# Patient Record
Sex: FEMALE | Race: WHITE
[De-identification: names, ages, dates, MRNs, and addresses within clinical notes are randomized per-mention and may not be internally consistent; named-entity substitution may affect disease eponyms.]

---

## 2017-03-20 ENCOUNTER — HOSPITAL ENCOUNTER (OUTPATIENT)
Dept: HOSPITAL 39 - GMAJ | Age: 67
Discharge: HOME | End: 2017-03-20
Attending: FAMILY MEDICINE
Payer: MEDICARE

## 2017-03-20 DIAGNOSIS — E78.1: Primary | ICD-10-CM

## 2017-03-20 DIAGNOSIS — I10: ICD-10-CM

## 2017-03-28 ENCOUNTER — HOSPITAL ENCOUNTER (OUTPATIENT)
Dept: HOSPITAL 39 - MRI | Age: 67
Discharge: HOME | End: 2017-03-28
Attending: FAMILY MEDICINE
Payer: MEDICARE

## 2017-03-28 DIAGNOSIS — M51.27: Primary | ICD-10-CM

## 2017-03-28 NOTE — MRI
EXAM DESCRIPTION: 



Lumbar Spine w/o Contrast



CLINICAL HISTORY: 



LOW BACK PAIN. Lumbar disc disease. Prior lumbar surgeries.

History of polio.



COMPARISON: 



MRI lumbar spine 10/16/2014



TECHNIQUE: 



MRI of the lumbar spine is performed according to our usual

protocol with axial and sagittal multi sequence imaging.



FINDINGS: 



The designated L5-S1 disc space is on axial T2 image 3.

Moderate to severe levoconvex curvature of the mid lumbar spine

is again demonstrated. Vertebral body statures maintained. Modic

type II endplate changes at L1-L2, L3-L4, and L4-L5.

There is no acute fracture or destructive osseous lesion. 

The conus medullaris terminates normally.



L1-2: Disc desiccation with moderate disc narrowing. Moderate to

severe facet hypertrophy. No evidence of spinal canal or neural

foraminal stenosis.



L2-3: Disc desiccation. Mild disc narrowing to the right of

midline. Severe facet hypertrophy with ligamentum flavum

thickening. No evidence of spinal canal or neural foraminal

stenosis.



L3-4: Disc desiccation. Severe facet hypertrophy. No evidence of

spinal canal or neural foraminal stenosis.



L4-5: Disc desiccation. Severe facet hypertrophy with suspected

fusion in the posterior elements. 4 mm leftward eccentric

posterior disc osteophyte complex with multifactorial moderate

spinal canal stenosis with residual AP diameter of the thecal sac

measuring 6.5 mm. Severe left greater than right neural foraminal

stenosis.



L5-S1: Severe facet hypertrophy. Suspected fusion in the

posterior elements. Mild posterior disc bulge and osteophytic

ridging of the endplates. Moderate to severe bilateral neural

foraminal stenosis mainly on the basis of facet hypertrophy. The

spinal canal is patent.



IMPRESSION: 



1. Moderate to severe levoconvex scoliosis in the lumbar spine is

again demonstrated.

2. At L4-L5, moderate spinal canal stenosis and severe left

greater than right neural foraminal stenosis are again

demonstrated, and are not significantly changed from the prior

exam.

3. At L5-S1, there is moderate to severe bilateral neural

foraminal stenosis. 

4. Other findings as above.



Electronically signed by:  Tanner Escobar MD  3/28/2017 1:39 PM CDT

## 2018-03-05 ENCOUNTER — HOSPITAL ENCOUNTER (OUTPATIENT)
Dept: HOSPITAL 39 - GMAJ | Age: 68
End: 2018-03-05
Attending: FAMILY MEDICINE
Payer: MEDICARE

## 2018-03-05 DIAGNOSIS — I10: Primary | ICD-10-CM

## 2018-09-17 ENCOUNTER — HOSPITAL ENCOUNTER (OUTPATIENT)
Dept: HOSPITAL 39 - GMAJ | Age: 68
End: 2018-09-17
Attending: FAMILY MEDICINE
Payer: MEDICARE

## 2018-09-17 DIAGNOSIS — E55.9: ICD-10-CM

## 2018-09-17 DIAGNOSIS — E53.8: Primary | ICD-10-CM

## 2018-09-20 ENCOUNTER — HOSPITAL ENCOUNTER (OUTPATIENT)
Dept: HOSPITAL 39 - CT | Age: 68
End: 2018-09-20
Attending: FAMILY MEDICINE
Payer: MEDICARE

## 2018-09-20 DIAGNOSIS — K44.9: ICD-10-CM

## 2018-09-20 DIAGNOSIS — R05: Primary | ICD-10-CM

## 2018-09-20 NOTE — CT
EXAM DESCRIPTION: 

Chest w/Contrast : Computed Tomography.



CLINICAL HISTORY: 

COUGH



COMPARISON: 

Portable chest x-ray 8/24/2017.



TECHNIQUE: 

Spiral-axial scans at 5.0 mm intervals through the lungs and

thorax with IV contrast. 2.5 mm lung algorithm axial

reconstructions. Coronal sagittal 2.0 Mm reconstructions. No

adverse reactions.  Total Exam DLP: 794.14 mGy-cm. This exam was

performed according to our departmental dose-optimization program

which includes automated exposure control, adjustment of the mA

and/or kV according to patient size and/or use of iterative

reconstruction technique; to reduce radiation dose to as low as

reasonably achievable (ALARA).



FINDINGS: 

Tiny small blebs in the parenchyma more prevalent in the

bilateral upper lobes compared to the bilateral lower lung

fields. No abnormal nodules masses or infiltrates bilaterally.

Atelectasis and volume loss in the left lower lobe due to large

medial posterior left chelly- diaphragmatic hernia containing the

stomach. No pleural effusion or pneumothorax bilaterally.



Thyroid gland enhances well in the base of the neck. No

abnormally sized lymph nodes or soft tissue masses in the

mediastinum, hilum, axilla or neck base. Asymmetric density in

the lower outer quadrant of the right breast. Asymmetric fatty

tissue with edema or increased fatty density abutting the left

lower lung base and extends to the lateral left abdomen

subcutaneous tissue. No definite mass..



Upper spleen is partially within the hernia described above.

Included peritoneal space shows no free air or fluid. Surgical

clips in the gallbladder fossa with no fluid. Minimal dilation of

the common bile duct. Included pancreas is negative.



Thoracic spondylosis. Dextroscoliosis of the mid and upper

thoracic spine. Included lumbar spine with levoscoliosis and

spondylosis from T1 to L2.



IMPRESSION: 

1. Minimal centrilobular type emphysematous changes with no

abnormal nodule mass or infiltrate.

2. Large posterior medial hernia in the left hemidiaphragm

containing the fundus and upper body of the stomach in the upper

spleen along with mesenteric fat. No gastric volvulus. Mass

effect with atelectasis on the left lower lobe. Consider surgical

evaluation if this hernia is compromising pulmonary or

gastrointestinal function.

3. Prominence and edema of subcutaneous tissues lateral to the

left thoracoabdominal wall extending inferiorly. No definite mass

or fluid collection.

4. Asymmetric density in the lower outer quadrant of the right

breast. PACS shows most recent mammogram at this facility was

November 2015. Consider bilateral diagnostic digital breast

tomosynthesis mammography.



Electronically signed by:  Juancarlos Chavez MD  9/20/2018 4:38 PM CDT

Workstation: [id number]

## 2018-10-11 ENCOUNTER — HOSPITAL ENCOUNTER (OUTPATIENT)
Dept: HOSPITAL 39 - RAD | Age: 68
End: 2018-10-11
Attending: FAMILY MEDICINE
Payer: MEDICARE

## 2018-10-11 DIAGNOSIS — K44.9: Primary | ICD-10-CM

## 2018-10-11 NOTE — RAD
EXAM DESCRIPTION: 

Barium Swallow: Rad-Fluoroscopy.



CLINICAL HISTORY: 

DIAPHRAGMATIC HERNIA WITHOUT OBSTRUCTION. Patient with polio at

infancy. Paraplegic. Sensation of food being stuck in the distal

esophagus. Reflux. Occasional abdominal pain.



COMPARISON: 

None.



TECHNIQUE:

The patient swallowed barium pill with water.   The patient

swallowed gas-producing granules, and water, prior to beginning

on the fluoroscopic table. Patient then drank heavy density

barium through a straw under fluoroscopic visualization in the

semiprone position. The images were obtained with the patient

prone supine and decubitus.  37 fluoroscopic cine loop images. 18

static fluoroscopic images.  Total fluoroscopy time was 3.5

minutes. DAP:  not recorded. 



FINDINGS: 

Patient has delayed primary peristaltic wave distally. No gómez

pharyngeal aspiration. Distal esophagus angled almost 90 degrees

posteriorly before entering the gastroesophageal junction. The

fundus and the proximal body of the stomach, with

para-esophageal hernia above the left chelly-diaphragm in the

posterior direction. This is best demonstrated on cine series #1,

image 2. Thickened mucosal folds in the fundus. When changing

positions on the table, marked gastroesophageal reflux was noted

almost at the level of the larynx. Breast demonstrated on RF

series 7, image 17. Remainder of the stomach well distended with

gas and contrast material with no thickened folds in the distal

body and antrum or pylorus. Duodenal bulb well distended. No

mucosal duodenal lesions or mass effect. 



IMPRESSION: 

1. Marked paraesophageal hernia involving the fundus and the

proximal body of the stomach in the medial posterior left

hemidiaphragm. Distal esophagus angulated almost 90 degrees.

Marked gastroesophageal reflux almost to the larynx.

2. Thickened folds in the fundus and body of the stomach could

represent gastritis. No other intrinsic lesions or mass effect.

No abnormal is unremarkable.



Electronically signed by:  Juancarlos Chavez MD  10/11/2018 5:09 PM

CDT Workstation: [id number]

## 2018-12-15 ENCOUNTER — HOSPITAL ENCOUNTER (EMERGENCY)
Dept: HOSPITAL 39 - ER | Age: 68
Discharge: HOME | End: 2018-12-15
Payer: MEDICARE

## 2018-12-15 VITALS — SYSTOLIC BLOOD PRESSURE: 145 MMHG | OXYGEN SATURATION: 98 % | DIASTOLIC BLOOD PRESSURE: 84 MMHG

## 2018-12-15 VITALS — TEMPERATURE: 97.4 F

## 2018-12-15 DIAGNOSIS — Z87.891: ICD-10-CM
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
## 2018-12-15 DIAGNOSIS — Z79.899: ICD-10-CM

## 2018-12-15 DIAGNOSIS — Z86.12: ICD-10-CM
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
## 2018-12-15 DIAGNOSIS — Z88.8: ICD-10-CM
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
## 2018-12-15 DIAGNOSIS — S82.55XA: Primary | ICD-10-CM

## 2018-12-15 DIAGNOSIS — K21.9: ICD-10-CM

## 2018-12-15 DIAGNOSIS — Z99.3: ICD-10-CM
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
## 2018-12-21 ENCOUNTER — HOSPITAL ENCOUNTER (OUTPATIENT)
Dept: HOSPITAL 39 - RAD | Age: 68
End: 2018-12-21
Attending: ORTHOPAEDIC SURGERY
Payer: MEDICARE

## 2018-12-21 DIAGNOSIS — S82.54XA: Primary | ICD-10-CM

## 2019-01-07 ENCOUNTER — HOSPITAL ENCOUNTER (OUTPATIENT)
Dept: HOSPITAL 39 - RAD | Age: 69
End: 2019-01-07
Attending: ORTHOPAEDIC SURGERY
Payer: MEDICARE

## 2019-01-07 DIAGNOSIS — S82.892A: Primary | ICD-10-CM

## 2019-01-07 NOTE — RAD
EXAM DESCRIPTION: 



Ankle,Left 3 Views



CLINICAL HISTORY: 



ANKLE PAIN



COMPARISON: 



21 December 2018



TECHNIQUE: 



3 views left



FINDINGS: 



The exam is obtained through casting material. A fracture of the

medial malleolus is observed. The injury is essentially

nondisplaced. The ankle mortise is intact. No callus formation is

visualized through the cast



IMPRESSION: 



Left medial malleolar fracture



Electronically signed by:  Clay Swenson MD  1/7/2019 9:41 AM

[facility] Workstation: [id number]

## 2019-01-18 ENCOUNTER — HOSPITAL ENCOUNTER (OUTPATIENT)
Dept: HOSPITAL 39 - RAD | Age: 69
End: 2019-01-18
Attending: ORTHOPAEDIC SURGERY
Payer: MEDICARE

## 2019-01-18 DIAGNOSIS — S82.55XD: Primary | ICD-10-CM

## 2019-02-11 ENCOUNTER — HOSPITAL ENCOUNTER (OUTPATIENT)
Dept: HOSPITAL 39 - RAD | Age: 69
End: 2019-02-11
Attending: ORTHOPAEDIC SURGERY
Payer: MEDICARE

## 2019-02-11 DIAGNOSIS — S82.55XD: Primary | ICD-10-CM
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
## 2019-02-22 ENCOUNTER — HOSPITAL ENCOUNTER (OUTPATIENT)
Dept: HOSPITAL 39 - GMAJ | Age: 69
End: 2019-02-22
Attending: FAMILY MEDICINE
Payer: MEDICARE

## 2019-02-22 DIAGNOSIS — E55.9: ICD-10-CM

## 2019-02-22 DIAGNOSIS — E53.8: Primary | ICD-10-CM

## 2019-06-18 ENCOUNTER — HOSPITAL ENCOUNTER (OUTPATIENT)
Dept: HOSPITAL 39 - LAB.O | Age: 69
End: 2019-06-18
Attending: ORTHOPAEDIC SURGERY
Payer: MEDICARE

## 2019-06-18 DIAGNOSIS — Z01.818: Primary | ICD-10-CM

## 2019-06-24 NOTE — HP
CHIEF COMPLAINT:  Left hand numbness.



HISTORY OF PRESENT ILLNESS:  Martin is a 68-year-old female that we had talked 
about having carpal tunnel release in the past.  She has had numbness going on 
in her hand for several years.  She has had no new trauma and states that it is 
now numb all the time.  It does worsen with certain activities.  Because of the 
presence of it and the lack of response to conservative measures, she has 
requested operative intervention.  After discussing the risks, benefits and 
alternatives to that, she has given informed consent.  



PAST SURGICAL HISTORY:  

1.  Spinal fusion.

2.  .

3.  Cholecystectomy.

4.  Hysterectomy.

5.  Bilateral rotator cuff repair.

6.  Cystectomy.

7.  Carpal tunnel release.

8.  Hiatal hernia repair.

9.  Ulnar nerve transposition.



MEDICATIONS:  

1.  Bisoprolol.

2.  Gabapentin.

3.  Dexilant.

4.  Clonazepam.

5.  Duloxetine.

6.  Cyclobenzaprine.

7.  Aleve.

8.  Fluticasone.



ALLERGIES:  DEMEROL, MORPHINE, REGLAN.



CODE STATUS:  Full code.



IMMUNIZATIONS:  Up to date.



SOCIAL HISTORY:  The patient does not smoke or use any illicit drugs.  She does 
drink on occasion.  



FAMILY HISTORY:  None pertinent to today's complaint.



REVIEW OF SYSTEMS:  Negative except as indicated in the History of Present 
Illness.



PHYSICAL EXAMINATION:



VITAL SIGNS:  Blood pressure 129/73.  Pulse 81.  Height 4'10".  Weight 155 
pounds.



MENTAL STATUS:  The patient is awake, alert, and is able to give a good history 
and participate in the physical.  The patient is oriented to person, place and 
time.



SKIN:  Normal tone and turgor.



MUSCULOSKELETAL:  She has positive carpal compression test and significant 
thenar atrophy.  She has paresthesias at rest in the distribution of the median 
nerve.  She has a warm and well perfused extremity.  She has no deformities in 
the hand and shows full range of motion in the shoulder, elbow, wrist and 
digits.



ASSESSMENT:

1.  Carpal tunnel syndrome.



PLAN: The plan at this point is for carpal tunnel release.  Martin and I have 
discussed in the past the risks, benefits and alternatives to this and we have 
also discussed the potential for limited recovery just given her presence of 
symptoms now on a continual basis.  After considering the risks, benefits and 
alternatives to this, she has given informed consent.



#[id number]

[facility]

## 2019-06-25 ENCOUNTER — HOSPITAL ENCOUNTER (OUTPATIENT)
Dept: HOSPITAL 39 - AMB | Age: 69
Discharge: HOME | End: 2019-06-25
Attending: ORTHOPAEDIC SURGERY
Payer: MEDICARE

## 2019-06-25 VITALS — TEMPERATURE: 98.1 F | DIASTOLIC BLOOD PRESSURE: 75 MMHG | OXYGEN SATURATION: 98 % | SYSTOLIC BLOOD PRESSURE: 115 MMHG

## 2019-06-25 DIAGNOSIS — Z90.710: ICD-10-CM

## 2019-06-25 DIAGNOSIS — Z88.5: ICD-10-CM

## 2019-06-25 DIAGNOSIS — Z98.1: ICD-10-CM

## 2019-06-25 DIAGNOSIS — K21.9: ICD-10-CM

## 2019-06-25 DIAGNOSIS — G56.02: Primary | ICD-10-CM

## 2019-06-25 DIAGNOSIS — D50.9: ICD-10-CM

## 2019-06-25 DIAGNOSIS — Z88.8: ICD-10-CM

## 2019-06-25 DIAGNOSIS — G14: ICD-10-CM

## 2019-06-25 DIAGNOSIS — M51.36: ICD-10-CM

## 2019-06-25 DIAGNOSIS — Z79.899: ICD-10-CM

## 2019-06-25 DIAGNOSIS — I10: ICD-10-CM

## 2019-06-25 DIAGNOSIS — G25.81: ICD-10-CM

## 2019-06-25 PROCEDURE — 01810 ANES PX NRV MUSC F/ARM WRST: CPT

## 2019-06-25 PROCEDURE — 80307 DRUG TEST PRSMV CHEM ANLYZR: CPT

## 2019-06-25 PROCEDURE — 64721 CARPAL TUNNEL SURGERY: CPT

## 2019-06-25 RX ADMIN — VANCOMYCIN HYDROCHLORIDE ONE MG: 500 INJECTION, POWDER, LYOPHILIZED, FOR SOLUTION INTRAVENOUS at 12:06

## 2019-06-25 RX ADMIN — VANCOMYCIN HYDROCHLORIDE ONE MG: 500 INJECTION, POWDER, LYOPHILIZED, FOR SOLUTION INTRAVENOUS at 12:10

## 2019-06-28 NOTE — OP
DATE OF PROCEDURE:  06/25/19 



PREOPERATIVE DIAGNOSIS:

1.  Carpal tunnel syndrome.



POSTOPERATIVE DIAGNOSIS:

1.  Carpal tunnel syndrome.



PROCEDURE:

1.  Carpal tunnel release.



SURGEON:  Husam Banda MD.



ASSISTANT:  Juancarlos Bates CST, SA-C.



ANESTHESIA:  Local with sedation.



COMPLICATIONS:  None.



FINDINGS:  

1.  Advanced thenar atrophy.

2.  Narrowing of the median nerve across the carpal tunnel.



INDICATION:  Ms. Morales has a long history of carpal tunnel syndrome.  She has 
delayed undergoing carpal tunnel release for quite some time.  She finally 
decided to pursue that.  After discussing the risks, benefits and alternatives 
to that, the patient has given informed consent for carpal tunnel release.



PROCEDURE: The patient was brought to the Operating Room and placed in the 
supine position.  Sedation was administered and local anesthetic was injected 
into the operative area under sterile conditions.  After the injection of 
anesthetic, the arm was sterilely prepped and draped.  A longitudinal incision 
was made directly overlying the transverse carpal ligament and blunt dissection 
was carried down to the ligament.  The transverse carpal ligament was sharply 
transected along its length and a Kirkland elevator was used to ensure complete 
release of the ligament.  Once release had been confirmed, the wound was 
thoroughly irrigated and the wound was closed with Nylon suture.  A sterile 
dressing was placed and the patient was taken to the Day Surgery Unit.  



POSTOPERATIVE PLAN:  The patient has been encouraged to do range of motion of 
the digits and will followup with us in two days.  



#[id number]

[facility]

## 2019-10-21 ENCOUNTER — HOSPITAL ENCOUNTER (EMERGENCY)
Dept: HOSPITAL 39 - ER | Age: 69
Discharge: HOME | End: 2019-10-21
Payer: MEDICARE

## 2019-10-21 VITALS — OXYGEN SATURATION: 96 % | DIASTOLIC BLOOD PRESSURE: 81 MMHG | SYSTOLIC BLOOD PRESSURE: 138 MMHG | TEMPERATURE: 98.1 F

## 2019-10-21 DIAGNOSIS — D72.829: ICD-10-CM

## 2019-10-21 DIAGNOSIS — M25.452: ICD-10-CM

## 2019-10-21 DIAGNOSIS — Z90.49: ICD-10-CM

## 2019-10-21 DIAGNOSIS — G89.29: ICD-10-CM

## 2019-10-21 DIAGNOSIS — Z79.899: ICD-10-CM

## 2019-10-21 DIAGNOSIS — M54.9: ICD-10-CM

## 2019-10-21 DIAGNOSIS — K57.32: Primary | ICD-10-CM

## 2019-10-21 DIAGNOSIS — K21.9: ICD-10-CM

## 2019-10-21 DIAGNOSIS — Z86.12: ICD-10-CM

## 2019-10-21 DIAGNOSIS — I10: ICD-10-CM

## 2019-10-21 PROCEDURE — 82553 CREATINE MB FRACTION: CPT

## 2019-10-21 PROCEDURE — 85025 COMPLETE CBC W/AUTO DIFF WBC: CPT

## 2019-10-21 PROCEDURE — 80053 COMPREHEN METABOLIC PANEL: CPT

## 2019-10-21 PROCEDURE — 84484 ASSAY OF TROPONIN QUANT: CPT

## 2019-10-21 PROCEDURE — 82550 ASSAY OF CK (CPK): CPT

## 2019-10-21 PROCEDURE — 87040 BLOOD CULTURE FOR BACTERIA: CPT

## 2019-10-21 PROCEDURE — 93005 ELECTROCARDIOGRAM TRACING: CPT

## 2019-10-21 PROCEDURE — 74177 CT ABD & PELVIS W/CONTRAST: CPT

## 2019-10-21 PROCEDURE — 85610 PROTHROMBIN TIME: CPT

## 2019-10-21 PROCEDURE — 83605 ASSAY OF LACTIC ACID: CPT

## 2019-10-21 PROCEDURE — 71045 X-RAY EXAM CHEST 1 VIEW: CPT

## 2019-10-21 PROCEDURE — 86140 C-REACTIVE PROTEIN: CPT

## 2019-10-21 NOTE — CT
EXAM DESCRIPTION: 

CT Abdomen and Pelvis With Contrast:



CLINICAL HISTORY: 

abd pain.



COMPARISON: 

CT abdomen pelvis without contrast May 9, 2013.



TECHNIQUE: 

Contiguous axial sections are obtained through the abdomen and

pelvis as per protocol after administration of iodinated

contrast. Oral contrast was not administered. . Sagittal and

coronal reformations were obtained.

Automatic exposure control (AEC), mA and/or kV adjustment by

patient size, and/or iterative  reconstructive technique was

used, per departmental dose optimization program, during the

performance of the CT examination.





FINDINGS: 

The  view demonstrates no abnormalities .



The visualized lung bases demonstrates small esophageal hernia. 

The liver is normal  in size and demonstrates normal attenuation

and enhancement. [The spleen, pancreas, adrenal glands appear

normal in size and attenuation without any focal abnormalities.

The gallbladder is surgically absent .



Kidneys demonstrate no evidence of calculi. Kidneys are normal in

size, shape, attenuation and enhancement. Focal parenchymal loss

and thinning is noted at the upper pole of the left kidney. 

The aorta, IVC and retroperitoneal structures appear normal  .

The stomach demonstrates No abnormalities. .

The small bowel loops appear unremarkable. The appendix is not

visualized with certainty. No inflammatory changes are seen in

the region of the base of the cecum, however.  The colon

demonstrates evidence of diverticulosis. Changes suggestive of

diverticulitis are noted involving the rectosigmoid colon with

colonic wall thickening and pericolonic stranding. No evidence of

extraluminal air or fluid collection is noted.

 No evidence of free intraperitoneal fluid or air is noted. 



CT examination of the pelvis demonstrates no evidence of mass or

adenopathy. The urinary bladder appears normal. 

The [Reproductive organs are absent surgically. 

Inguinal regions are unremarkable. 



Marked scoliosis of the thoracolumbar spine is noted. Evidence of

degenerative changes are seen in the facet joints. Evidence of

deformity and flattening of the left femoral head is noted with

large left hip joint effusion. Subchondral cyst formation is

noted in the left femoral head. Remodeling of the left acetabulum

is noted. Presence of a small right hip joint effusion and

capsular thickening is noted. Fatty replacement of the

paraspinous and gluteal muscles are noted greater on the left

than the right.





IMPRESSION: 

Uncomplicated acute diverticulitis of the distal

sigmoid/rectosigmoid colon. Presence of a large left hip joint

effusion and degenerative changes. Postcholecystectomy. Mild

scoliosis and degenerative changes involving the lumbar spine. 



Electronically signed by:  Maria R Crowder MD  10/21/2019 6:29 PM

[facility] Workstation: [id number]

## 2019-10-21 NOTE — ED.PDOC
History of Present Illness





- General


Chief Complaint: Abdominal Pain


Time Seen by Provider: 10/21/19 16:31


Information Source: patient





- History of Present Illness


Initial Comments: 





68 y/o F with hx of polio myelitis presents to the ED c/o worsening abd and back

pain over the last 2 days. She has a hx of chronic back and abd pain but sx have

been worse over the last few weeks and she saw her PCP last week and was treated

for constipation. She has since been having BMs but pain has persisted. She 

takes home pain medications only when needed and took it last night without 

improvement in sx. She has a second fundiplication done in 2019 by Dr. Marcelino 

at [facility] in [facility] and had been good up until August with her stomach. Since 

then it seems that she has gone down hill. She states that she has been trying 

to eat but sx are significantly worse after eating. Pain now is 10/10 in 

severity and nothing she does seems to make it significantly better or worse. 

She denies fever/chills. No diarrhea or blood in stool. She was sent to the ED 

from her PCP office for a WBC if 22k and concerns for infection and that she 

needed a CT scan. 





Review of Systems





- Review of Systems


Constitutional: Denies: chills, fever


EENTM: Denies: nose congestion, throat pain


Respiratory: Denies: cough, short of breath


Cardiology: Denies: chest pain, palpitations


Gastrointestinal/Abdominal: States: abdominal pain, nausea.  Denies: diarrhea, 

vomiting


Genitourinary: Denies: dysuria, frequency, hematuria


Musculoskeletal: States: back pain.  Denies: joint pain, muscle pain


Skin: Denies: lesions, rash


Neurological: Denies: headache, paresthesia, weakness





Past Medical History (General)





- Patient Medical History


Hx Stroke: No


Hx Congestive Heart Failure: No


Hx Hypertension: Yes


Hx Diabetes: No


Hx Gastroesophageal Reflux: Yes


Hx MRSA: No


Surgical History: cholecystectomy, Hysterectomy, other





- Vaccination History


Hx Influenza Vaccination: Yes


Hx Pneumococcal Vaccination: Yes





- Social History


Hx Tobacco Use: No


Hx Alcohol Use: No


Hx Substance Use: No


Hx Depression: No


Hx Physical Abuse: No


Hx Emotional Abuse: No


Hx Suspected Abuse: No





- Female History


Patient Pregnant: No





Family Medical History





- Family History


  ** Mother


Living Status: 


Hx Family Hypertension: Yes - dad


Hx Family Diabetes: Yes - dad,brother


Hx Family Cancer: Yes - mom-uterine;brother-kidney


Hx Family;Other: dementia-dad





Physical Exam





- Physical Exam


General Appearance: Agitated - diffuse, Obvious distress


Eyes, Ears, Nose, Throat Exam: PERRL/EOMI, normal ENT inspection, pharynx normal


Neck: full range of motion, normal inspection


Respiratory: lungs clear, normal breath sounds, no respiratory distress


Cardiovascular/Chest: normal peripheral pulses, no edema, tachycardia


Peripheral Pulses: 2+


Gastrointestinal/Abdominal: tenderness - diffuse, other - multiple well healed 

previous surgical scars


Back Exam: decreased range of motion, vertebral tenderness, other - scoliosis 

and well healed surgical scars


Extremity: other - contractures to BLE with muscle wasting to BLE. UE normal


Neurologic: alert, other - Chronic contractures to LE with muscle wasting. No 

acute focal deficiets


Skin Exam: normal color, warm/dry





Progress





- Progress


Progress: 





10/21/19 18:45 Lab and imaging results discussed with pt. We discussed CT 

findings of diverticulitis and hip effusion. Labs reviewed and show leukocytosis

with nl lactic acid. Chemistry unremarkable. These findings were discussed with 

pt along with discussion of inpatient vs out pt treatment. I discussed admission

and pt states that she would prefer to try out patient management if at all 

possible. We discussed cipro, flagyl, nausea medication and use of her home 

hydorcodone for pain. She was advised to call her PCP tomorrow to update them of

our findings today and to schedule a f/u appt as soon as possible. We also 

discussed that at some point she will need a referral to GI for a colonoscopy. 

She was told to return at any point if she feels like pain is uncontrolled, has 

fever or generally feels like her condition has worsened. Pt voiced 

understanding and agrees with this treatment plan and all questions/concerns 

were addressed. 














- Results/Orders


Results/Orders: 


                                        





10/21/19 16:39


Sodium Chloride 0.9% (Flush) [Saline Flush Syringe]   10 ml IV PRN PRN 





10/21/19 16:40


IV Care:Saline Lock per Protoc QSHIFT 


Telemetry .ONCE 


EKG Stat 


Pulse Ox Stat 


URINALYSIS Stat 





10/21/19 16:41


Hold Metformin x 48Hrs [id number] 





10/21/19 17:37


BLOOD CULTURE Stat 





10/21/19 18:45


LACTIC ACID Q2H 





10/21/19 20:45


LACTIC ACID Q2H 





10/21/19 22:45


LACTIC ACID Q2H 





10/22/19 00:45


LACTIC ACID Q2H 





10/22/19 02:45


LACTIC ACID Q2H 





10/22/19 04:45


LACTIC ACID Q2H 





10/22/19 06:45


LACTIC ACID Q2H 





10/22/19 08:45


LACTIC ACID Q2H 





10/22/19 10:45


LACTIC ACID Q2H 





10/22/19 12:45


LACTIC ACID Q2H 





10/22/19 14:45


LACTIC ACID Q2H 








                         Laboratory Results - last 24 hr











  10/21/19 10/21/19 10/21/19





  16:50 16:50 16:50


 


WBC   20.0 H 


 


RBC   5.32 


 


Hgb   13.6 


 


Hct   41.5 


 


MCV   78.1 L 


 


MCH   25.5 L 


 


MCHC   32.6 L 


 


RDW   18.6 H 


 


Plt Count   327 


 


MPV   7.6 


 


Absolute Neuts (auto)   Not Reportable 


 


Absolute Lymphs (auto)   Not Reportable 


 


Absolute Monos (auto)   Not Reportable 


 


Absolute Eos (auto)   Not Reportable 


 


Neutrophils %   Not Reportable 


 


Neutrophils % (Manual)   76.0 


 


Lymphocytes %   Not Reportable 


 


Lymphocytes % (Manual)   19.0 


 


Monocytes %   Not Reportable 


 


Monocytes % (Manual)   2.0 


 


Eosinophils %   Not Reportable 


 


Basophils %   Not Reportable 


 


Band Neutrophils   2.0 


 


Eosinophils   1.0 


 


Platelet Estimate   Normal 


 


Normal RBC Morphology   Normal rbc morph 


 


PT    10.6


 


INR    1.06


 


Sodium  134 L  


 


Potassium  3.8  


 


Chloride  101  


 


Carbon Dioxide  22  


 


Anion Gap  14.8  


 


BUN  8  


 


Creatinine  < 0.40 L  


 


BUN/Creatinine Ratio  20.0  


 


Random Glucose  104  


 


Serum Osmolality  266.9 L  


 


Lactic Acid   


 


Calcium  9.4  


 


Total Bilirubin  1.1 H  


 


AST  24  


 


ALT  19  


 


Alkaline Phosphatase  94  


 


Creatine Kinase  77  


 


CK-MB (CK-2)  2.2  


 


CK-MB (CK-2) %  Not Reportable  


 


Troponin I  0.03  


 


Serum Total Protein  6.9  


 


Albumin  3.4  


 


Globulin  3.5  


 


Albumin/Globulin Ratio  1.0 L  














  10/21/19





  16:50


 


WBC 


 


RBC 


 


Hgb 


 


Hct 


 


MCV 


 


MCH 


 


MCHC 


 


RDW 


 


Plt Count 


 


MPV 


 


Absolute Neuts (auto) 


 


Absolute Lymphs (auto) 


 


Absolute Monos (auto) 


 


Absolute Eos (auto) 


 


Neutrophils % 


 


Neutrophils % (Manual) 


 


Lymphocytes % 


 


Lymphocytes % (Manual) 


 


Monocytes % 


 


Monocytes % (Manual) 


 


Eosinophils % 


 


Basophils % 


 


Band Neutrophils 


 


Eosinophils 


 


Platelet Estimate 


 


Normal RBC Morphology 


 


PT 


 


INR 


 


Sodium 


 


Potassium 


 


Chloride 


 


Carbon Dioxide 


 


Anion Gap 


 


BUN 


 


Creatinine 


 


BUN/Creatinine Ratio 


 


Random Glucose 


 


Serum Osmolality 


 


Lactic Acid  1.1


 


Calcium 


 


Total Bilirubin 


 


AST 


 


ALT 


 


Alkaline Phosphatase 


 


Creatine Kinase 


 


CK-MB (CK-2) 


 


CK-MB (CK-2) % 


 


Troponin I 


 


Serum Total Protein 


 


Albumin 


 


Globulin 


 


Albumin/Globulin Ratio 




















CT Abd/Pelvis:


 IMPRESSION: Uncomplicated acute diverticulitis of the distal 

sigmoid/rectosigmoid colon. Presence of a large left hip joint effusion and 

degenerative changes. Postcholecystectomy. Mild scoliosis and degenerative 

changes involving the lumbar spine.  Electronically signed by: Maria R Crowder MD 

10/21/2019 6:29 PM CDT Workstation: [id number]





CXR:


IMPRESSION: Left basilar atelectasis.   Electronically signed by: Maria R Crowder MD 10/21/2019 6:20 PM CDT Workstation: [id number]





- EKG/XRAY/CT


EKG: Tachy - rate 111, nonspecific ST T wave Chg


Comments: LAD. Normal intervals. 





- Consult/PCP


Time Called: 19:09 - Discussed Pt lab and CT findings in ED along with pt 

preference to go home. He agrees with plan and will f/u in office. 


Consult/PCP: Dr. Neri, PCP





Departure





- Departure


Clinical Impression: 


 Diverticulitis





Leukocytosis, unspecified


Qualifiers:


 Leukocytosis type: unspecified Qualified Code(s): D72.829 - Elevated white 

blood cell count, unspecified





Abdominal pain


Qualifiers:


 Abdominal location: generalized Qualified Code(s): R10.84 - Generalized 

abdominal pain





Time of Disposition: 19:01


Disposition: Discharge to Home or Self Care


Condition: Good


Departure Forms:  ED Discharge - Pt. Copy, Patient Portal Self Enrollment


Instructions:  DI for Abdominal Pain-Adult, Diverticulitis


Diet: other - advance diet as tolerated


Referrals: 


Ky Neri MD [Primary Care Provider] - 1-2 Days


Prescriptions: 


Ciprofloxacin [Cipro] 500 mg PO BID #20 tab


metroNIDAZOLE [Flagyl] 500 mg PO Q8H #30 tab


Ondansetron Odt (ER Disp) [Zofran ODT (ER DISP)] 4 mg PO Q8H PRN #7 tab


 PRN Reason: Nausea


Home Medications: 


Ambulatory Orders





Bisoprolol & Hydrochlorothiazi [Bisoprolol Fumarate/Hydro 5-6.25 mg] 1 tab PO 

DAILY 12/15/18 


Duloxetine HCl [Cymbalta] 60 mg PO DAILY 12/15/18 


Gabapentin [Neurontin] 1,200 mg PO TID 12/15/18 


Acetamin W/Cod #3 Tab [Tylenol w/CODEINE #3] 1 ea PO Q8HR 19 


Cyanocobalamin Inj [Vitamin B-12 Inj] 1,000 mcg IM WKLY 19 


Cyclobenzaprine Tab (ER Disp) [Flexeril Tab (ER Dispense)] 1 tablet PO PRN PRN 

19 


Fluticasone Propionate (Nasal) [Fluticasone Propionate] 50 mcg NA PRN PRN 

19 


cloNAZepam [Klonopin] 1 tablet PO DAILY 19 


Ciprofloxacin [Cipro] 500 mg PO BID #20 tab 10/21/19 


Ondansetron Odt (ER Disp) [Zofran ODT (ER DISP)] 4 mg PO Q8H PRN #7 tab 10/21/19




metroNIDAZOLE [Flagyl] 500 mg PO Q8H #30 tab 10/21/19 








Additional Instructions: 


Return for persistent of uncontrolled pain, fevers or any other concerning 

signs/sx.

## 2019-10-21 NOTE — RAD
EXAM DESCRIPTION: 

 XR Chest, one view



CLINICAL HISTORY: 

abd pain.



COMPARISON: 

CT chest with contrast September 20, 2018



FINDINGS:

The heart is normal in size. The pulmonary vascularity  is

normal.



The lungs are clear. No dense focal consolidation is seen.

Discoid atelectasis is seen in the left lower lobe.

No pneumothorax or pleural effusion is seen.



Thoracolumbar scoliosis is noted. 



IMPRESSION: 

Left basilar atelectasis. 

 



Electronically signed by:  Maria R Crowder MD  10/21/2019 6:20 PM

CDT Workstation: [id number]

## 2019-11-06 ENCOUNTER — HOSPITAL ENCOUNTER (OUTPATIENT)
Dept: HOSPITAL 39 - MRI | Age: 69
End: 2019-11-06
Attending: FAMILY MEDICINE
Payer: MEDICARE

## 2019-11-06 DIAGNOSIS — M47.896: ICD-10-CM

## 2019-11-06 DIAGNOSIS — M48.062: Primary | ICD-10-CM

## 2019-11-06 DIAGNOSIS — M41.86: ICD-10-CM

## 2019-11-07 NOTE — MRI
PROVIDED CLINICAL HISTORY/REASON FOR EXAM: SPINAL STENOSIS LUMBAR

REGION 



TECHNIQUE:  Multiplanar, multisequence MRI examination performed

of the lumbar spine without intravenous contrast material.  



COMPARISON:  March 28, 2017



FINDINGS: 



Five lumbar type vertebra are assumed. The designated L5/S1 disc

space is at axial T2 image 3.



Alignment:  40 degrees leftward curvature of the lumbar spine. No

acute subluxation.



Fracture: None present.



Paraspinal Soft Tissues:  Unremarkable.



Retroperitoneum:  Visible structures are unremarkable.



Conus Medullaris:  Termination at L1 level. Morphology is normal.



T12/L1:  Bilateral facet hypertrophy. No significant stenosis.



L1/2:  Asymmetric disc desiccation and loss of disc space height

on the right. Bilateral facet hypertrophy. No significant

stenosis.



L2/3:  Asymmetric disc desiccation and loss of disc space height

on the right. Bilateral facet hypertrophy, right greater than

left. No significant stenosis.



L3/4:  Asymmetric disc desiccation and loss of disc space height

on the right. Right greater than left facet hypertrophy with

thickening of the ligamentum flavum. No significant stenosis.



L4/5:  Disc desiccation with loss of disc space height and

endplate degenerative change. Diffuse symmetric disc bulge

osteophyte complex. Bilateral facet hypertrophy with thickening

of the ligamentum flavum. Left facet joint effusion. Mild central

canal stenosis. Mild bilateral lateral recess stenosis. Severe

bilateral neural foraminal narrowing.



L5/S1:  Asymmetric right disc desiccation and loss of disc space

height. Diffuse symmetric disc bulge osteophyte complex.

Bilateral facet hypertrophy with thickening of the ligamentum

flavum. Trace left facet joint effusion. Moderate central canal

stenosis. Severe bilateral neural foraminal narrowing. Sacral

Tarlov cysts.

    



IMPRESSION:  

Multilevel lumbar spondylosis superimposed upon levoscoliosis

most pronounced at L4/L5 and L5/S1 as above.



Electronically signed by:  Ronaldo Velez MD  11/7/2019 1:17 PM

[facility] Workstation: [id number]

## 2019-11-11 ENCOUNTER — HOSPITAL ENCOUNTER (OUTPATIENT)
Dept: HOSPITAL 39 - RAD | Age: 69
End: 2019-11-11
Attending: ORTHOPAEDIC SURGERY
Payer: MEDICARE

## 2019-11-11 DIAGNOSIS — M85.88: ICD-10-CM

## 2019-11-11 DIAGNOSIS — M16.0: Primary | ICD-10-CM

## 2019-11-11 NOTE — RAD
EXAM DESCRIPTION: Hip Bilateral (accession [id number]), Pelvis

(accession [id number])



CLINICAL HISTORY: 69 years Female, HIP PAIN



COMPARISON: None.



Findings: 

Partially imaged multilevel lumbar spondylosis. Left gluteal soft

tissue calcification. Degenerative changes in the sacroiliac

joints. Osteopenia.



Right hip: Shallow acetabulum with mild right hip osteoarthritis.

No acute fracture or dislocation is identified.



Left hip: Shallow acetabulum, with sequela of left hip dysplasia.

Similar osseous remodeling of the femoral head and acetabulum.

There is no acute extra or dislocation identified. The femoral

head is superiorly and laterally subluxed with respect to the

acetabulum. Suspect a large left hip joint effusion. Findings are

overall similar when compared with 5/9/2013 examination.



IMPRESSION:

Chronic and degenerative changes in the pelvis. No acute fracture

is identified. However osteopenia limits evaluation. If there is

concern for fracture, MRI would be helpful for further

evaluation.



Electronically signed by:  Ronaldo Velez MD  11/11/2019 10:51 AM

[facility] Workstation: [id number]

## 2019-11-18 ENCOUNTER — HOSPITAL ENCOUNTER (EMERGENCY)
Dept: HOSPITAL 39 - ER | Age: 69
Discharge: HOME | End: 2019-11-18
Payer: MEDICARE

## 2019-11-18 VITALS — DIASTOLIC BLOOD PRESSURE: 72 MMHG | TEMPERATURE: 98.2 F | SYSTOLIC BLOOD PRESSURE: 114 MMHG

## 2019-11-18 VITALS — OXYGEN SATURATION: 94 %

## 2019-11-18 DIAGNOSIS — K21.9: ICD-10-CM

## 2019-11-18 DIAGNOSIS — Z87.19: ICD-10-CM

## 2019-11-18 DIAGNOSIS — R11.0: ICD-10-CM

## 2019-11-18 DIAGNOSIS — Z79.899: ICD-10-CM

## 2019-11-18 DIAGNOSIS — Z88.8: ICD-10-CM

## 2019-11-18 DIAGNOSIS — I10: ICD-10-CM

## 2019-11-18 DIAGNOSIS — Z88.5: ICD-10-CM

## 2019-11-18 DIAGNOSIS — R10.9: ICD-10-CM

## 2019-11-18 DIAGNOSIS — K59.00: Primary | ICD-10-CM

## 2019-11-18 PROCEDURE — 82550 ASSAY OF CK (CPK): CPT

## 2019-11-18 PROCEDURE — 82150 ASSAY OF AMYLASE: CPT

## 2019-11-18 PROCEDURE — 83735 ASSAY OF MAGNESIUM: CPT

## 2019-11-18 PROCEDURE — 84484 ASSAY OF TROPONIN QUANT: CPT

## 2019-11-18 PROCEDURE — 80053 COMPREHEN METABOLIC PANEL: CPT

## 2019-11-18 PROCEDURE — 85610 PROTHROMBIN TIME: CPT

## 2019-11-18 PROCEDURE — 85730 THROMBOPLASTIN TIME PARTIAL: CPT

## 2019-11-18 PROCEDURE — 83690 ASSAY OF LIPASE: CPT

## 2019-11-18 PROCEDURE — 83605 ASSAY OF LACTIC ACID: CPT

## 2019-11-18 PROCEDURE — 82553 CREATINE MB FRACTION: CPT

## 2019-11-18 PROCEDURE — 81001 URINALYSIS AUTO W/SCOPE: CPT

## 2019-11-18 PROCEDURE — 74176 CT ABD & PELVIS W/O CONTRAST: CPT

## 2019-11-18 PROCEDURE — 87040 BLOOD CULTURE FOR BACTERIA: CPT

## 2019-11-18 PROCEDURE — 85025 COMPLETE CBC W/AUTO DIFF WBC: CPT

## 2019-11-18 NOTE — ED.PDOC
History of Present Illness





- General


Chief Complaint: Abdominal Pain


Stated Complaint: R flank discomfort


Time Seen by Provider: 19 10:52


Source: patient


Exam Limitations: no limitations





- History of Present Illness


Initial Comments: 





The patient is a 69-year-old  female presenting to emergency room 

secondary to severe pain in the right flank and abdomen starting yesterday 

evening.  She did recently have a bout of diverticulitis.  She does have some 

chronic constipation issues.  No fever.  Mild nausea but no vomiting.  No 

definite urinary symptoms.  No chest pain or shortness of breath.


Timing/Duration: 24 hours


Severity: moderate


Improving Factors: nothing


Worsening Factors: nothing


Associated Symptoms: malaise, nausea/vomiting


Allergies/Adverse Reactions: 


Allergies





Meperidine Allergy (Verified 19 10:55)


   


Metoclopramide [From Reglan] Allergy (Verified 19 10:55)


   


Morphine Allergy (Verified 19 10:55)


   





Home Medications: 


Ambulatory Orders





Bisoprolol & Hydrochlorothiazi [Bisoprolol Fumarate/Hydro 5-6.25 mg] 1 tab PO 

DAILY 12/15/18 


Duloxetine HCl [Cymbalta] 60 mg PO DAILY 12/15/18 


Gabapentin [Neurontin] 1,200 mg PO TID 12/15/18 


Acetamin W/Cod #3 Tab [Tylenol w/CODEINE #3] 1 ea PO Q8HR 19 


Cyanocobalamin Inj [Vitamin B-12 Inj] 1,000 mcg IM WKLY 19 


Cyclobenzaprine Tab (ER Disp) [Flexeril Tab (ER Dispense)] 1 tablet PO PRN PRN 

19 


Fluticasone Propionate (Nasal) [Fluticasone Propionate] 50 mcg NA PRN PRN 

19 


cloNAZepam [Klonopin] 1 tablet PO DAILY 19 


Ciprofloxacin [Cipro] 500 mg PO BID #20 tab 10/21/19 


Ondansetron Odt (ER Disp) [Zofran ODT (ER DISP)] 4 mg PO Q8H PRN #7 tab 10/21/19




metroNIDAZOLE [Flagyl] 500 mg PO Q8H #30 tab 10/21/19 











Review of Systems





- Review of Systems


Constitutional: States: no symptoms reported


EENTM: States: no symptoms reported


Respiratory: States: no symptoms reported


Cardiology: States: no symptoms reported


Gastrointestinal/Abdominal: States: abdominal pain, constipation, nausea


Genitourinary: States: no symptoms reported


Musculoskeletal: States: back pain


Skin: States: no symptoms reported


Neurological: States: no symptoms reported


Endocrine: States: no symptoms reported


All other Systems: No Change from Baseline





Past Medical History (General)





- Patient Medical History


Hx Stroke: No


Hx Congestive Heart Failure: No


Hx Hypertension: Yes


Hx Diabetes: No


Hx Gastroesophageal Reflux: Yes


Hx MRSA: No





- Vaccination History


Hx Influenza Vaccination: Yes


Hx Pneumococcal Vaccination: Yes





- Social History


Hx Tobacco Use: No


Hx Alcohol Use: No


Hx Substance Use: No


Hx Depression: No


Hx Physical Abuse: No


Hx Emotional Abuse: No


Hx Suspected Abuse: No





- Female History


Patient Pregnant: No





Family Medical History





- Family History


  ** Mother


Living Status: 


Hx Family Hypertension: Yes - dad


Hx Family Diabetes: Yes - dad,brother


Hx Family Cancer: Yes - mom-uterine;brother-kidney


Hx Family;Other: dementia-dad





Physical Exam





- Physical Exam


General Appearance: Alert, Obvious distress


Eye Exam: bilateral normal


Ears, Nose, Throat: hearing grossly normal, normal ENT inspection


Neck: full range of motion, supple


Respiratory: lungs clear, normal breath sounds, no respiratory distress, no 

accessory muscle use


Cardiovascular/Chest: normal peripheral pulses, no edema, tachycardia


Peripheral Pulses: radial,right: 2+, radial,left: 2+


Gastrointestinal/Abdominal: other - obese.  Right upper quadrant discomfort 

palpation.


Rectal Exam: deferred


Back Exam: other - right flank discomfort.


Extremity: other - chronic changes related to her ongoing illnesses


Neurologic: CNs II-XII nml as tested, alert, normal mood/affect, oriented x 3


Skin Exam: normal color


Comments: 





                               Vital Signs - 24 hr











  19





  10:48 11:43 12:00


 


Temperature 98.3 F  


 


Pulse Rate [ 130 H 127 H 126 H





Left Radial]   


 


Respiratory 20 20 20





Rate   


 


Blood Pressure 142/115 151/84 143/85





[Right Arm]   


 


O2 Sat by Pulse 97 94 L 94 L





Oximetry   














Progress





- Progress


Progress: 





19 12:58


the patient is a 69-year-old  female presenting with right abdominal 

and flank pain.  This appears to be due to significant constipation.  She will 

be written for GoLYTELY to take this evening to get cleaned out.  She does need 

to take her routine daily medications when she gets home.  She needs to keep h

erself well hydrated.  Mild tachycardia is likely related to not taking her home

medications as morning.  Laboratory work and imaging is otherwise reassuring.  

ER warnings were given.  Follow-up with primary care doctor towards the end of 

the week.





- Results/Orders


Results/Orders: 





                                Laboratory Tests











  19





  11:00 11:00 11:00


 


WBC   12.5 H 


 


RBC   5.50 H 


 


Hgb   14.0 


 


Hct   42.7 


 


MCV   77.6 L 


 


MCH   25.6 L 


 


MCHC   32.9 L 


 


RDW   17.6 H 


 


Plt Count   356 


 


MPV   8.1 


 


Absolute Neuts (auto)   9.80 H 


 


Absolute Lymphs (auto)   1.50 


 


Absolute Monos (auto)   0.60 


 


Absolute Eos (auto)   0.40 


 


Absolute Basos (auto)   0.10 


 


Neutrophils %   78.6 H 


 


Lymphocytes %   12.0 L 


 


Monocytes %   5.2 


 


Eosinophils %   3.5 


 


Basophils %   0.7 


 


PT    9.9


 


INR    0.99


 


PTT (SP)    26.5


 


Sodium  140  


 


Potassium  3.5 L  


 


Chloride  109  


 


Carbon Dioxide  18 L  


 


Anion Gap  16.5  


 


BUN  9  


 


Creatinine  0.42 L  


 


BUN/Creatinine Ratio  21.4 H  


 


Random Glucose  133 H  


 


Serum Osmolality  280.0  


 


Lactic Acid   


 


Calcium  9.5  


 


Magnesium  1.8  


 


Total Bilirubin  0.8  


 


AST  29  


 


ALT  16  


 


Alkaline Phosphatase  76  


 


Creatine Kinase  101  


 


CK-MB (CK-2)  4.1  


 


CK-MB (CK-2) %  Not Reportable  


 


Troponin I  0.04  


 


Serum Total Protein  6.9  


 


Albumin  3.6  


 


Globulin  3.3  


 


Albumin/Globulin Ratio  1.1  


 


Amylase  38  


 


Lipase  15 L  


 


Urine Color   


 


Urine Appearance   


 


Urine pH   


 


Ur Specific Gravity   


 


Urine Protein   


 


Urine Glucose (UA)   


 


Urine Ketones   


 


Urine Blood   


 


Urine Nitrite   


 


Urine Bilirubin   


 


Urine Urobilinogen   


 


Ur Leukocyte Esterase   


 


Urine RBC   


 


Urine WBC   


 


Ur Epithelial Cells   


 


Ur Transition Epith Cell   


 


Urine Bacteria   


 


Urine Mucus   














  19





  11:00 12:28


 


WBC  


 


RBC  


 


Hgb  


 


Hct  


 


MCV  


 


MCH  


 


MCHC  


 


RDW  


 


Plt Count  


 


MPV  


 


Absolute Neuts (auto)  


 


Absolute Lymphs (auto)  


 


Absolute Monos (auto)  


 


Absolute Eos (auto)  


 


Absolute Basos (auto)  


 


Neutrophils %  


 


Lymphocytes %  


 


Monocytes %  


 


Eosinophils %  


 


Basophils %  


 


PT  


 


INR  


 


PTT (SP)  


 


Sodium  


 


Potassium  


 


Chloride  


 


Carbon Dioxide  


 


Anion Gap  


 


BUN  


 


Creatinine  


 


BUN/Creatinine Ratio  


 


Random Glucose  


 


Serum Osmolality  


 


Lactic Acid  1.9 


 


Calcium  


 


Magnesium  


 


Total Bilirubin  


 


AST  


 


ALT  


 


Alkaline Phosphatase  


 


Creatine Kinase  


 


CK-MB (CK-2)  


 


CK-MB (CK-2) %  


 


Troponin I  


 


Serum Total Protein  


 


Albumin  


 


Globulin  


 


Albumin/Globulin Ratio  


 


Amylase  


 


Lipase  


 


Urine Color   Yellow


 


Urine Appearance   Clear


 


Urine pH   6.0


 


Ur Specific Gravity   1.015


 


Urine Protein   100 H


 


Urine Glucose (UA)   Negative


 


Urine Ketones   15 H


 


Urine Blood   Trace-lysed H


 


Urine Nitrite   Negative


 


Urine Bilirubin   Negative


 


Urine Urobilinogen   0.2


 


Ur Leukocyte Esterase   Negative


 


Urine RBC   1-3


 


Urine WBC   3-5 H


 


Ur Epithelial Cells   1-3


 


Ur Transition Epith Cell   0-1


 


Urine Bacteria   0


 


Urine Mucus   Trace








CT scan of the abdomen and pelvis shows no evidence of acute renal pathology.  

She does have significant constipation around the hepatic flexure area.  No 

evidence of continued diverticulitis.





Departure





- Departure


Clinical Impression: 


Constipation


Qualifiers:


 Constipation type: unspecified constipation type Qualified Code(s): K59.00 - 

Constipation, unspecified





Disposition: Discharge to Home or Self Care


Condition: Fair


Departure Forms:  ED Discharge - Pt. Copy, Patient Portal Self Enrollment


Instructions:  Constipation, Adult (DC)


Diet: regular diet - High-fiber


Activity: increase activity as tolerated


Referrals: 


Ky Neri MD [Primary Care Provider] - 1-2 Weeks


Home Medications: 


Ambulatory Orders





Bisoprolol & Hydrochlorothiazi [Bisoprolol Fumarate/Hydro 5-6.25 mg] 1 tab PO 

DAILY 12/15/18 


Duloxetine HCl [Cymbalta] 60 mg PO DAILY 12/15/18 


Gabapentin [Neurontin] 1,200 mg PO TID 12/15/18 


Acetamin W/Cod #3 Tab [Tylenol w/CODEINE #3] 1 ea PO Q8HR 19 


Cyanocobalamin Inj [Vitamin B-12 Inj] 1,000 mcg IM WKLY 19 


Cyclobenzaprine Tab (ER Disp) [Flexeril Tab (ER Dispense)] 1 tablet PO PRN PRN 

19 


Fluticasone Propionate (Nasal) [Fluticasone Propionate] 50 mcg NA PRN PRN 

19 


cloNAZepam [Klonopin] 1 tablet PO DAILY 19 


Ciprofloxacin [Cipro] 500 mg PO BID #20 tab 10/21/19 


Ondansetron Odt (ER Disp) [Zofran ODT (ER DISP)] 4 mg PO Q8H PRN #7 tab 10/21/19




metroNIDAZOLE [Flagyl] 500 mg PO Q8H #30 tab 10/21/19 








Additional Instructions: 


the patient is a 69-year-old  female presenting with right abdominal 

and flank pain.  This appears to be due to significant constipation.  She will 

be written for GoLYTELY to take this evening to get cleaned out.  She does need 

to take her routine daily medications when she gets home.  She needs to keep 

herself well hydrated.  Mild tachycardia is likely related to not taking her 

home medications as morning.  Laboratory work and imaging is otherwise 

reassuring.  the patient does need to take a fiber supplement such as FiberCon 

or Metamucil daily.  I would also recommend that she take MiraLAX 3 or 4 times a

 week to prevent this from happening again.  ER warnings were given.  Follow-up 

with primary care doctor towards the end of the week.

## 2019-11-18 NOTE — CT
EXAM DESCRIPTION: Abdoment/Pelvis w/o Contrast



CLINICAL HISTORY: 69 years Female, right flank pain,

diverticulitis 2 wks ago



COMPARISON: CT abdomen and pelvis with contrast dated 10/21/2019.



TECHNIQUE: Contiguous 3 mm axial images were obtained from the

lung bases to the level of the proximal femora without the

administration of intravenous or oral contrast. Sagittal and

coronal reconstructions were reviewed.



FINDINGS: Limited evaluation of the solid organs due to the lack

of intravenous contrast.



THORAX: The imaged lower thorax demonstrates no gross

abnormality.



LIVER: The liver demonstrates normal size and density with no

intrahepatic biliary ductal dilatation.



GALLBLADDER: Surgically absent.



PANCREAS: Appears normal with no cystic or solid lesions.



SPLEEN: Normal



ADRENAL GLANDS: Normal with no nodules or masses.



KIDNEYS: Both kidneys are symmetric in size and contour with no

hydronephrosis or nephrolithiasis or perinephric fluid

collections. The visualized ureters appear grossly unremarkable.



STOMACH: Small hiatal hernia is noted. The stomach is not

well-distended limiting detailed evaluation.



SMALL BOWEL: The small bowel loops demonstrate variable degrees

of distention with no abnormal dilatation or other signs to

suggest bowel obstruction.



LARGE BOWEL: Multiple diverticuli are identified. The transverse

and descending colon is not well-distended limiting detailed

evaluation. Large amount of fecal material is identified in the

ascending colon and proximal transverse colon, consistent with

constipation.



No evidence of free intraperitoneal air or fluid.



RETROPERITONEUM: The abdominal aorta is nonaneurysmal with mild

atherosclerosis. The inferior vena cava is normal in size and

caliber. No abnormally enlarged retroperitoneal lymph nodes are

identified.



URINARY BLADDER:The urinary bladder is well-distended with no

gross abnormality.



The uterus and ovaries are surgically absent.



ADDITIONAL FINDINGS: None. 



BONES:  Moderate to severe degenerative changes are identified in

the visualized bones. Bilateral hip joint effusions are

identified. Moderate to severe osteoarthritis of the left hip

with significant synovial thickening.



IMPRESSION:



1. Interval resolution of the previously identified acute

diverticulitis.

2. Large amount of fecal material is noted in the ascending colon

and proximal transverse colon.

3. Small hiatal hernia with reflux.



This exam was performed according to our departmental

dose-optimization program, which includes automated exposure

control, adjustment of the mA and/or kV according to patient size

and/or use of iterative reconstruction technique.





Electronically signed by:  Eden Krishna MD  11/18/2019 12:23

[facility] Workstation: [id number]

## 2019-11-19 ENCOUNTER — HOSPITAL ENCOUNTER (OUTPATIENT)
Dept: HOSPITAL 39 - ER | Age: 69
Setting detail: OBSERVATION
LOS: 1 days | Discharge: HOME | End: 2019-11-20
Attending: NURSE PRACTITIONER | Admitting: NURSE PRACTITIONER
Payer: MEDICARE

## 2019-11-19 DIAGNOSIS — Z88.8: ICD-10-CM

## 2019-11-19 DIAGNOSIS — T40.2X5A: ICD-10-CM

## 2019-11-19 DIAGNOSIS — Z79.899: ICD-10-CM

## 2019-11-19 DIAGNOSIS — G14: ICD-10-CM

## 2019-11-19 DIAGNOSIS — Z90.710: ICD-10-CM

## 2019-11-19 DIAGNOSIS — G89.4: ICD-10-CM

## 2019-11-19 DIAGNOSIS — G25.81: ICD-10-CM

## 2019-11-19 DIAGNOSIS — K21.9: ICD-10-CM

## 2019-11-19 DIAGNOSIS — K59.03: Primary | ICD-10-CM

## 2019-11-19 DIAGNOSIS — I10: ICD-10-CM

## 2019-11-19 DIAGNOSIS — Z86.2: ICD-10-CM

## 2019-11-19 DIAGNOSIS — Z79.891: ICD-10-CM

## 2019-11-19 DIAGNOSIS — M19.90: ICD-10-CM

## 2019-11-19 DIAGNOSIS — Z88.6: ICD-10-CM

## 2019-11-19 DIAGNOSIS — Z90.49: ICD-10-CM

## 2019-11-19 DIAGNOSIS — K57.30: ICD-10-CM

## 2019-11-19 PROCEDURE — 96374 THER/PROPH/DIAG INJ IV PUSH: CPT

## 2019-11-19 PROCEDURE — 99285 EMERGENCY DEPT VISIT HI MDM: CPT

## 2019-11-19 PROCEDURE — 74019 RADEX ABDOMEN 2 VIEWS: CPT

## 2019-11-19 PROCEDURE — 96375 TX/PRO/DX INJ NEW DRUG ADDON: CPT

## 2019-11-19 RX ADMIN — FLUTICASONE PROPIONATE SCH: 50 SPRAY, METERED NASAL at 15:03

## 2019-11-19 RX ADMIN — GABAPENTIN SCH MG: 300 CAPSULE ORAL at 20:01

## 2019-11-19 RX ADMIN — GABAPENTIN SCH MG: 300 CAPSULE ORAL at 14:27

## 2019-11-19 NOTE — RAD
EXAM:

  XR Abdomen, 2 Views



CLINICAL HISTORY:

  The patient is 69 years old and is Female; persistent abdominal

and back pain



TECHNIQUE:

  Frontal view of the abdomen/pelvis with upright view of the

abdomen.



COMPARISON:

  No relevant prior studies available.



FINDINGS:

  INTRAPERITONEAL SPACE:  No free air.

  GASTROINTESTINAL TRACT:  Bowel gas pattern is nonobstructive.

No dilated loops of bowel are seen. No abnormal PATIENT or soft

tissue masses are noted.

  BONES/JOINTS:  Scoliotic curvature of the spine is present.

Extensive multilevel intervertebral disc space narrowing and

osteophyte formation throughout the thoracolumbar spine is

present.

  SOFT TISSUES:  Surgical clips are present within the right

upper quadrant.



IMPRESSION:     

1.  Nonobstructive bowel gas pattern.

2.  Extensive degenerative change of the spine.



Electronically signed by:  Soumya Belle MD  11/19/2019 6:39 AM

[facility] Workstation: [id number]

## 2019-11-19 NOTE — HP
SUPERVISING PHYSICIAN:  Hugo Ralph MD



CHIEF COMPLAINT:  Abdominal pain.



HISTORY OF PRESENT ILLNESS:  This is a 69-year-old female who came to the 
Emergency Room for significant right sided abdominal pain and right flank pain. 
She was seen in the ER the day prior with labs and CT scan.  CT scan at that 
time did not show any significant inflammation or infectious source, however, 
showed significant constipation right around the hepatic flexure in the exact 
place she was hurting.  She was given a prescription for GoLYTELY and sent home,
however, when she got home and drank a couple of cups of it, it made her sick to
her stomach and she was unable to take her regular medications.  She did, 
however, have two small bowel movements with it.  She came to the ER due to the 
feeling that she needed to vomit along with persistent abdominal pain.  She had 
an abdominal x-ray in the ER which did not show any acute pathology.  Labs are 
unremarkable, but due to the intractable pain and inability to tolerate 
GoLYTELY, she was referred for observation.  At the time of examination, the 
patient is complaining of some right flank pain.  She was given some pain 
medication in the Emergency Room earlier.  She takes hydrocodone on a regular 
basis.



PAST MEDICAL HISTORY:  

1.  Polio.

2.  Hypertension.

3.  Diverticulosis.

4.  Gastroesophageal reflux disease.

5.  Osteoarthritis.

6.  Carpal tunnel syndrome.

7.  Anemia.

8.  Restless leg syndrome.



PAST SURGICAL HISTORY:  

1.  Appendectomy.

2.  Cholecystectomy.

3.  Three C-sections.

4.  Hysterectomy.

5.  Laparoscopic Nissen fundoplication.  

6.  Lumbar spine surgery.

7.  Multiple musculoskeletal surgeries.

8.  Carpal tunnel release.

9.  Left parotidectomy.

10. Left rotator cuff surgery.

11. Spinal cord cyst removal.



MEDICATIONS:  Please see medication reconciliation list once it is verified in 
the computer.



ALLERGIES:  DEMEROL, MORPHINE AND REGLAN.



FAMILY HISTORY:  Father had type 2 diabetes.  Mother had hypertension.  A 
brother had renal cell carcinoma.



SOCIAL HISTORY:  She is  and has one child.  She has a distant history of
smoking.  She socially drinks, no illegal drugs.  



REVIEW OF SYSTEMS: 

CONSTITUTIONAL: No fever or chills.  No recent weight loss or weight gain.  

HEENT:  No headaches, vision changes, ear pain, nasal congestion or throat pain.
 

RESPIRATORY:  No cough, hemoptysis or pleuritic chest pain.

CARDIOVASCULAR:  No chest pain, palpitations or peripheral edema.

GASTROINTESTINAL:  Positive for some nausea, no vomiting.  Positive for 
constipation, positive for right sided abdominal pain.

GENITOURINARY:  No dysuria, frequency or flank pain.

MUSCULOSKELETAL:  No acute complaints.  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  

NEUROLOGIC:  No syncope, paresthesias or seizures.  



PHYSICAL EXAMINATION: 



VITAL SIGNS:  Blood pressure 121/82.  Heart rate 80.  Respiratory rate 18.  
Temperature 97.6.  Oxygen saturation 96%.



GENERAL:  Ms. Morales is a 69-year-old female who is in no active distress 
currently.  



NEUROLOGIC:  The patient is alert and oriented.



LUNGS:  Clear to auscultation bilaterally.



CARDIOVASCULAR:  Regular rate and rhythm.  Normal S1, S2.



ABDOMEN:  Soft.  Positive bowel sounds.  Tenderness to palpation in mid to right
upper quadrant.  No significant lower quadrant pain.



GENITOURINARY:  Deferred.



EXTREMITIES:  Lower extremities with no edema.  Pulses 2+.  



ASSESSMENT:

1.  Intractable abdominal pain secondary to colonic constipation at the hepatic

     flexure.

2.  Chronic pain syndrome requiring p.o. narcotics.

3.  Post-polio syndrome.

4.  Gastroesophageal reflux disease.

5.  Hypertension.

6.  History of diverticulosis without diverticulitis.

7.  History of anemia.



PLAN:  At this time, the patient will be admitted for observation.  We will try 
to assist her clearing out her colon.  I am going to change the GoLYTELY to mag 
citrate to reduce volume.  In the interim, I will give her some pain medication 
and nausea medication to assist with the symptoms.  Hopefully she can leave in 
the afternoon or tomorrow morning pending results from the medications.



#[id number]

[facility]

## 2019-11-19 NOTE — ED.PDOC
History of Present Illness





- General


Chief Complaint: Back Pain or Injury


Stated Complaint: right flank pain


Time Seen by Provider: 19 05:47


Source: patient


Exam Limitations: no limitations





- History of Present Illness


Initial Comments: 





the patient is a 69-year-old  female presenting to emergency room 

secondary to persistent right-sided abdominal and flank pain.  She was seen 

yesterday and had a workup including laboratory work and a CT scan.  The only 

significant pathology noted was significant constipation around the hepatic 

flexure where she was hurting.  The patient was referred for GoLYTELY to take to

get cleaned out.  The patient apparently took a couple of cups of it but felt 

that it made her sick in her stomach.  She has since had 2 small to medium bowel

movements.  She is still having significant pain.  No fever.  No vomiting though

she feels like she needs to vomit.  No new symptoms otherwise.  Two-view x-ray 

of the abdomen will be obtained to see if there is any significant change.


Timing/Duration: constant


Severity: severe


Improving Factors: nothing


Associated Symptoms: nausea/vomiting


Allergies/Adverse Reactions: 


Allergies





Meperidine Allergy (Verified 19 06:01)


   


Metoclopramide [From Reglan] Allergy (Verified 19 06:01)


   


Morphine Allergy (Verified 19 06:01)


   





Home Medications: 


Ambulatory Orders





Bisoprolol & Hydrochlorothiazi [Bisoprolol Fumarate/Hydro 5-6.25 mg] 1 tab PO 

DAILY 12/15/18 


Duloxetine HCl [Cymbalta] 60 mg PO DAILY 12/15/18 


Gabapentin [Neurontin] 1,200 mg PO TID 12/15/18 


Acetamin W/Cod #3 Tab [Tylenol w/CODEINE #3] 1 ea PO Q8HR 19 


Cyanocobalamin Inj [Vitamin B-12 Inj] 1,000 mcg IM WKLY 19 


Cyclobenzaprine Tab (ER Disp) [Flexeril Tab (ER Dispense)] 1 tablet PO PRN PRN 

19 


Fluticasone Propionate (Nasal) [Fluticasone Propionate] 50 mcg NA PRN PRN 

19 


cloNAZepam [Klonopin] 1 tablet PO DAILY 19 


Ciprofloxacin [Cipro] 500 mg PO BID #20 tab 10/21/19 


Ondansetron Odt (ER Disp) [Zofran ODT (ER DISP)] 4 mg PO Q8H PRN #7 tab 10/21/19




metroNIDAZOLE [Flagyl] 500 mg PO Q8H #30 tab 10/21/19 











Review of Systems





- Review of Systems


Constitutional: States: malaise


EENTM: States: no symptoms reported


Respiratory: States: no symptoms reported


Cardiology: States: no symptoms reported


Gastrointestinal/Abdominal: States: see HPI, abdominal pain, constipation, 

nausea


Genitourinary: States: no symptoms reported


Musculoskeletal: States: back pain


Skin: States: no symptoms reported


Neurological: States: see HPI - chronic changes related to previous diagnoses, 

anxiety


Endocrine: States: no symptoms reported


All other Systems: No Change from Baseline





Past Medical History (General)





- Patient Medical History


Hx Stroke: No


Hx Congestive Heart Failure: No


Hx Hypertension: Yes


Hx Diabetes: No


Hx Gastroesophageal Reflux: Yes


Hx MRSA: No





- Vaccination History


Hx Influenza Vaccination: Yes


Hx Pneumococcal Vaccination: Yes





- Social History


Hx Tobacco Use: No


Hx Alcohol Use: No


Hx Substance Use: No


Hx Depression: No


Hx Physical Abuse: No


Hx Emotional Abuse: No


Hx Suspected Abuse: No





- Female History


Patient Pregnant: No





Family Medical History





- Family History


  ** Mother


Living Status: 


Hx Family Hypertension: Yes - dad


Hx Family Diabetes: Yes - dad,brother


Hx Family Cancer: Yes - mom-uterine;brother-kidney


Hx Family;Other: dementia-dad





Physical Exam





- Physical Exam


General Appearance: Alert, Anxious, Obvious distress


Eye Exam: bilateral normal


Ears, Nose, Throat: hearing grossly normal, normal ENT inspection


Neck: full range of motion, supple


Respiratory: lungs clear, normal breath sounds, no respiratory distress, no 

accessory muscle use


Cardiovascular/Chest: normal peripheral pulses, regular rate, rhythm, no edema


Peripheral Pulses: radial,right: 2+, radial,left: 2+


Gastrointestinal/Abdominal: other - obese.  Pain is still primarily localized to

 the right upper quadrant and right flank.


Rectal Exam: deferred


Back Exam: CVA tenderness (R)


Extremity: normal capillary refill, other - chronic changes related to previous 

diagnoses.


Neurologic: CNs II-XII nml as tested, alert, normal mood/affect, oriented x 3


Skin Exam: normal color





Progress





- Progress


Progress: 





19 06:49


the patient is a 69-year-old  female presenting with right abdomen and 

flank pain for the second time in 24 hours.  She was unable to tolerate very 

much of the GoLYTELY.  stool output appears to be minimal.   Additionally she 

has not taken several of her blood pressure and chronic pain medications over 

the last 24 hours likely worsening the pain crisis.  She is mildly tachycardic 

as a result.  The patient is going to be admitted for slow cleanout over the 

next 24 hours with GoLYTELY.  She will also be admitted for pain control as 

needed.  She does understand that large amounts of opioids are counterproductive

 with constipation.  she did take a hydrocodone just prior to coming up.  The 

patient does have significant mobility limitations and will require the help 

with getting back and forth to the bathroom during the cleanout.  She was given 

her morning doses of her chronic pain and blood pressure medications here and 

now before we restart the GoLYTELY.  I believe this will go a long way towards 

reducing her pain.  Obviously if additional symptoms develop, or symptoms 

change, then additional workup may be warranted that points to a different 

source, however at this time constipation seems to still be the most likely 

source of the pain for this patient.





- Results/Orders


Results/Orders: 





two-view abdomen shows a persistent significant stool.  No evidence of other 

acute pathology on the plain x-ray.





Departure





- Departure


Clinical Impression: 


Constipation


Qualifiers:


 Constipation type: slow transit constipation Qualified Code(s): K59.01 - Slow 

transit constipation





Disposition: Admit Patient


Departure Forms:  ED Discharge - Pt. Copy, Patient Portal Self Enrollment


Referrals: 


Ky Neri MD [Primary Care Provider] - 1-2 Weeks


Home Medications: 


Ambulatory Orders





Bisoprolol & Hydrochlorothiazi [Bisoprolol Fumarate/Hydro 5-6.25 mg] 1 tab PO 

DAILY 12/15/18 


Duloxetine HCl [Cymbalta] 60 mg PO DAILY 12/15/18 


Gabapentin [Neurontin] 1,200 mg PO TID 12/15/18 


Acetamin W/Cod #3 Tab [Tylenol w/CODEINE #3] 1 ea PO Q8HR 19 


Cyanocobalamin Inj [Vitamin B-12 Inj] 1,000 mcg IM WKLY 19 


Cyclobenzaprine Tab (ER Disp) [Flexeril Tab (ER Dispense)] 1 tablet PO PRN PRN 

19 


Fluticasone Propionate (Nasal) [Fluticasone Propionate] 50 mcg NA PRN PRN 

19 


cloNAZepam [Klonopin] 1 tablet PO DAILY 19 


Ciprofloxacin [Cipro] 500 mg PO BID #20 tab 10/21/19 


Ondansetron Odt (ER Disp) [Zofran ODT (ER DISP)] 4 mg PO Q8H PRN #7 tab 10/21/19




metroNIDAZOLE [Flagyl] 500 mg PO Q8H #30 tab 10/21/19 











Decision To Admit





- Decistion To Admit


Decision to Admit Reason: Medical Nature


Decision to Admit Date: 19


Decision to Admit Time: 06:53

## 2019-11-20 VITALS — SYSTOLIC BLOOD PRESSURE: 126 MMHG | TEMPERATURE: 98 F | OXYGEN SATURATION: 98 % | DIASTOLIC BLOOD PRESSURE: 81 MMHG

## 2019-11-20 RX ADMIN — FLUTICASONE PROPIONATE SCH SPRAY: 50 SPRAY, METERED NASAL at 08:32

## 2019-11-20 RX ADMIN — GABAPENTIN SCH MG: 300 CAPSULE ORAL at 08:31

## 2019-11-20 NOTE — RAD
Procedure:  XR ABDOMEN 2 VIEWS SUPINE ERECT        



Exam Date:  11/20/2019



Ordering Provider:  Madeleine Diaz NP



Clinical Indication:  constipation



Comparison: 11/19/2019



Findings: 

Surgical clips in right upper quadrant.

Nonobstructive bowel gas pattern. 

There is no pneumoperitoneum. 

There are no suspicious calcifications. 

There is no acute osseous abnormality. Scoliosis.



Impression: 

1. No acute findings.





Electronically signed by:  Chiki Kilpatrick MD  11/20/2019 8:49 AM CST

Workstation: [id number]

## 2019-11-20 NOTE — DS
SUPERVISING PHYSICIAN:  Hugo Ralph MD



DISCHARGE DIAGNOSIS:

1.  Intractable abdominal pain secondary to colonic constipation at the hepatic

     flexure.

2.  Chronic pain syndrome requiring p.o. narcotics.

3.  Post-polio syndrome.

4.  Gastroesophageal reflux disease.

5.  Hypertension.

6.  History of diverticulosis without diverticulitis.

7.  History of anemia.



HISTORY OF PRESENT ILLNESS:  This is a 69-year-old female who came to the 
Emergency Room with significant right sided abdominal pain and right flank pain.
 She was seen in the Emergency Room the day prior with labs and CT scan.  CT 
scan at that time did not show any significant inflammation or infectious 
source, however, showed significant constipation around the hepatic flexure in 
the exact place she was hurting.  She was given a prescription for GoLYTELY and 
sent home, however, when she got home and drank a couple of cups of it, it made 
her sick to her stomach and she was unable to take her regular medications.  She
did, however, have two small bowel movements with it.  She came to the ER due to
the feeling that she needed to vomit along with persistent abdominal pain.  Her 
abdominal x-ray in the ER did not show any acute pathology.  Labs were 
unremarkable, but due to the intractable pain and inability to tolerate 
GoLYTELY, she was referred for observation.  At the time of examination, the 
patient was complaining of some right flank pain.  She was given some pain 
medication in the Emergency Room.  She takes hydrocodone on a regular basis.



HOSPITAL COURSE:  She was admitted for observation.  Her laxative was changed 
from GoLYTELY to mag citrate.  She was also given pain medications as well as 
nausea medications.  She did have several bowel movements.  She had no other 
complications during her stay.  She did have a good bowel clean-out and today, 
she will be discharged home in stable condition.



LABORATORY:  Her labs were from her previous ER visit and her WBCs were slightly
high at 12.5.  She did have a slight left shift on differential, but the 
remainder of her CBC was unremarkable.  Her chemistry showed sodium 140, 
potassium 3.5, chloride 109, carbon dioxide 18, BUN 9, creatinine 0.42.  Glucose
133, lactic acid 1.  Her liver enzymes were within normal limits.  Amylase 38, 
lipase 15.  Abdomen and pelvis CT is as the history of present illness.  Her 
first abdominal x-ray showed 1) Nonobstructive bowel gas pattern.  2) Extensive 
degenerative changes of the spine.  Today, her abdominal x-ray shows no acute 
findings.  



DISCHARGE PLAN:  The patient will be discharged home in stable condition.  She 
is to resume her previous diet as well as her previous activity.  In addition to
her routine home medications, I have also given a prescription of MiraLAX daily,
to discuss with Dr. Neri continuation of that as well as a possible followup 
with her GI physician.  She is to followup with the hospital or Dr. Neri' 
office for any problems or complications.



DISCHARGE MEDICATIONS: 

1.  Cymbalta.

2.  Neurontin.

3.  Fumarate/hydrochlorothiazide.

4.  Flexeril.

5.  Fluticasone.

6.  Klonopin.

7.  Zofran.

8.  Norco.

9.  Diazepam.

10. MiraLAX.



#[id number]

[facility]

## 2019-11-26 ENCOUNTER — HOSPITAL ENCOUNTER (OUTPATIENT)
Dept: HOSPITAL 39 - MRI | Age: 69
End: 2019-11-26
Payer: MEDICARE

## 2019-11-26 DIAGNOSIS — M47.26: ICD-10-CM

## 2019-11-26 DIAGNOSIS — G14: ICD-10-CM

## 2019-11-26 DIAGNOSIS — M47.24: ICD-10-CM

## 2019-11-26 DIAGNOSIS — M41.84: ICD-10-CM

## 2019-11-26 DIAGNOSIS — M48.061: Primary | ICD-10-CM

## 2019-11-26 NOTE — MRI
EXAM DESCRIPTION: 

Thoracic Spine w/o Contrast: Magnetic Resonance Imaging.



CLINICAL HISTORY: 

THORACIC SPONDYLOSIS WITH RADICULOPATHY



COMPARISON: 

MRI lumbar spine 6 November 2019.



TECHNIQUE: 

Multiplanar, multiple standard sequences, non contrast MRI,

thoracic spine.



FINDINGS: 

T5-T6 disc desiccation and minimal disc space loss. Posterior

midline bulge not abutting the cord. Mild canal narrowing. Mild

bilateral foraminal narrowing. Left facet joint negative, with

minimal arthrosis medial right facet joint abutting the posterior

cord. Moderate canal narrowing.



T10-T11: Disc desiccation with mild to moderate disc space loss

and endplate erosions anterior disc bulge with large spurs. Disc

bulging in the midline contacting the cord and to the left of

midline with disc osteophyte complex and hypertrophic left facet

encroaching on the foramen which is markedly stenotic. Moderate

narrowing right foramen.



T11-T12: Spondylosis moderate with anterior disc bulge and large

spurs. Posterior disc bulge impressing on the cord with

borderline mild canal stenosis. Borderline mild left foraminal

stenosis with moderate right foraminal stenosis



Remaining discs with relatively normal normal signal. Disc spaces

are preserved. Canal and foramina are patent. No scoliosis. Facet

joints are unremarkable. Conus terminates at L1.  Cord with

normal signal, no compression. Convex left T10-T12 curvature.

Paravertebral soft tissues with muscle atrophy. Otherwise normal

marrow signal in the remaining vertebral bodies and the posterior

elements. Vertebral bodies are not compressed at any level. 



IMPRESSION: 

1. Levo scoliosis T10-T12 with advanced degeneration and

spondylosis. T10-T11 disc bulge in the midline contacting the

cord. Left side spondylosis and disc osteophyte complex resulting

in left foraminal stenosis. Most likely compromise left T10

nerve.

2. Advanced spondylosis T11-T12 with borderline right foraminal

stenosis and moderate right neural foraminal stenosis and

compromise right T12 nerve. Borderline canal stenosis.

3. Other findings as detailed above.



Electronically signed by:  Juancarlos Chavez MD  11/26/2019 7:26 PM

[facility] Workstation: [id number]

## 2019-12-04 ENCOUNTER — HOSPITAL ENCOUNTER (OUTPATIENT)
Dept: HOSPITAL 39 - RAD | Age: 69
End: 2019-12-04
Attending: PAIN MEDICINE
Payer: MEDICARE

## 2019-12-04 DIAGNOSIS — M41.86: ICD-10-CM

## 2019-12-04 DIAGNOSIS — M48.062: Primary | ICD-10-CM

## 2019-12-04 DIAGNOSIS — M47.896: ICD-10-CM

## 2019-12-04 NOTE — RAD
EXAM DESCRIPTION: 



Lumbar Spine 5 Views



CLINICAL HISTORY: 



SPINAL STENOSIS LUMBAR REGION



COMPARISON: 



None.



TECHNIQUE: 



5 views



FINDINGS: 



The lumbar vertebral bodies are in good AP alignment. Extensive

posterior fusion is observed throughout the lumbar and lower

thoracic spine. Surgical clips are seen in the right upper

quadrant. Marked left lumbar scoliosis is observed. The exam does

reveal some significant intervertebral disc degenerative changes

in the lower thoracic spine. Mild osteopenia is observed.



IMPRESSION: 





1. Marked left lumbar scoliosis.

2. Extensive posterior lumbar fusion.

3. Diffuse degenerative changes are observed most pronounced in

the lower thoracic region.



Electronically signed by:  Clay Swenson MD  12/4/2019 4:08 PM

[facility] Workstation: [id number]

## 2020-07-07 ENCOUNTER — HOSPITAL ENCOUNTER (OUTPATIENT)
Dept: HOSPITAL 39 - GMAJ | Age: 70
End: 2020-07-07
Attending: FAMILY MEDICINE
Payer: MEDICARE

## 2020-07-07 DIAGNOSIS — I10: ICD-10-CM

## 2020-07-07 DIAGNOSIS — R30.0: ICD-10-CM

## 2020-07-07 DIAGNOSIS — M25.541: Primary | ICD-10-CM

## 2020-08-13 ENCOUNTER — HOSPITAL ENCOUNTER (OUTPATIENT)
Dept: HOSPITAL 39 - RAD | Age: 70
End: 2020-08-13
Attending: ORTHOPAEDIC SURGERY
Payer: MEDICARE

## 2020-08-13 DIAGNOSIS — S62.001A: Primary | ICD-10-CM

## 2020-08-13 NOTE — RAD
EXAM DESCRIPTION: Wrist,Right 3 Views



CLINICAL HISTORY: 70 years Female, PAIN IN RIGHT WRIST



COMPARISON: None.



Findings: 3 view(s)/radiograph(s)



Linear nondisplaced scaphoid waist fracture. Widening of the

scapholunate interval with settling of the capitate. TFCC

chondrocalcinosis. Osteopenia. Mild scattered degenerative

changes. Carpal alignment maintained. No other fracture. No

dislocation.



IMPRESSION:

Linear nondisplaced scaphoid waist fracture.



Right wrist degenerative changes as above.



Electronically signed by:  Ronaldo Velez MD  8/13/2020 3:22 PM

[facility] Workstation: [id number]